# Patient Record
Sex: FEMALE | Race: WHITE | NOT HISPANIC OR LATINO | Employment: UNEMPLOYED | URBAN - METROPOLITAN AREA
[De-identification: names, ages, dates, MRNs, and addresses within clinical notes are randomized per-mention and may not be internally consistent; named-entity substitution may affect disease eponyms.]

---
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
## 2021-03-21 ENCOUNTER — OFFICE VISIT (OUTPATIENT)
Dept: URGENT CARE | Facility: URGENT CARE | Age: 21
End: 2021-03-21
Payer: COMMERCIAL

## 2021-03-21 VITALS
DIASTOLIC BLOOD PRESSURE: 69 MMHG | TEMPERATURE: 97.9 F | RESPIRATION RATE: 16 BRPM | HEART RATE: 98 BPM | OXYGEN SATURATION: 100 % | BODY MASS INDEX: 18.52 KG/M2 | WEIGHT: 106.2 LBS | SYSTOLIC BLOOD PRESSURE: 102 MMHG

## 2021-03-21 DIAGNOSIS — R30.0 DYSURIA: Primary | ICD-10-CM

## 2021-03-21 DIAGNOSIS — N30.00 ACUTE CYSTITIS WITHOUT HEMATURIA: ICD-10-CM

## 2021-03-21 DIAGNOSIS — R82.90 NONSPECIFIC FINDING ON EXAMINATION OF URINE: ICD-10-CM

## 2021-03-21 LAB
ALBUMIN UR-MCNC: 100 MG/DL
APPEARANCE UR: ABNORMAL
BACTERIA #/AREA URNS HPF: ABNORMAL /HPF
BILIRUB UR QL STRIP: NEGATIVE
COLOR UR AUTO: YELLOW
GLUCOSE UR STRIP-MCNC: NEGATIVE MG/DL
HGB UR QL STRIP: ABNORMAL
KETONES UR STRIP-MCNC: NEGATIVE MG/DL
LEUKOCYTE ESTERASE UR QL STRIP: ABNORMAL
NITRATE UR QL: NEGATIVE
NON-SQ EPI CELLS #/AREA URNS LPF: ABNORMAL /LPF
PH UR STRIP: 8 PH (ref 5–7)
RBC #/AREA URNS AUTO: ABNORMAL /HPF
SOURCE: ABNORMAL
SP GR UR STRIP: 1.02 (ref 1–1.03)
UROBILINOGEN UR STRIP-ACNC: 0.2 EU/DL (ref 0.2–1)
WBC #/AREA URNS AUTO: >100 /HPF

## 2021-03-21 PROCEDURE — 99213 OFFICE O/P EST LOW 20 MIN: CPT | Performed by: FAMILY MEDICINE

## 2021-03-21 PROCEDURE — 87086 URINE CULTURE/COLONY COUNT: CPT | Performed by: FAMILY MEDICINE

## 2021-03-21 PROCEDURE — 81001 URINALYSIS AUTO W/SCOPE: CPT | Performed by: FAMILY MEDICINE

## 2021-03-21 RX ORDER — SULFAMETHOXAZOLE/TRIMETHOPRIM 800-160 MG
1 TABLET ORAL 2 TIMES DAILY
Qty: 10 TABLET | Refills: 0 | Status: SHIPPED | OUTPATIENT
Start: 2021-03-21 | End: 2022-06-16

## 2021-03-21 NOTE — PATIENT INSTRUCTIONS

## 2021-03-21 NOTE — PROGRESS NOTES
ASSESSMENT/  PLAN:   Dysuria     - UA reflex to Microscopic and Culture  - Urine Microscopic    Nonspecific finding on examination of urine     - Urine Culture Aerobic Bacterial    Acute cystitis without hematuria     - sulfamethoxazole-trimethoprim (BACTRIM DS) 800-160 MG tablet; Take 1 tablet by mouth 2 times daily         Drink plenty of fluids.  Prevention and treatment of UTI's discussed.Signs and symptoms of pyelonephritis mentioned.  Follow up with primary care physician if not improving    We discussed that a urine culture is being performed and that if we find that if there is a bacteria in the urine that is resistant to the prescribed antibiotic he/she will receive call to change the antibiotic to better cover the infection.      -------------------------------------------------------------------------------------------    SUBJECTIVE:  Chief Complaint   Patient presents with     UTI     Painful urination, feeling of fullness, and cloudy urine.         Veronica Navarro is a 20 year old female who  presents today for a possible UTI. Symptoms of dysuria, urgency, frequency and burning have been going on for 1day(s).  Hematuria no.  sudden onset, still present and constantand moderate.  There is no history of fever, chills, nausea or vomiting.  No history of vaginal   discharge. This patient does   have a history of urinary tract infections. Patient denies long duration, rigors, flank pain and temperature > 101 degrees F. or vaginal discharge, vaginal odor and vaginal itching     No past medical history on file.  Patient Active Problem List   Diagnosis     Strep throat       ALLERGIES:  Miralax [polyethylene glycol]    IBUPROFEN PO, Take 200 mg by mouth  JUNEL FE 1.5/30 1.5-30 MG-MCG tablet, Take 1 tablet by mouth daily  XULANE 150-35 MCG/24HR patch,     No current facility-administered medications on file prior to visit.       Social History     Tobacco Use     Smoking status: Former Smoker     Types:  Other     Smokeless tobacco: Never Used   Substance Use Topics     Alcohol use: No        ROS:   CONSTITUTIONAL:NEGATIVE for fever, chills,    INTEGUMENTARY/SKIN: NEGATIVE for worrisome rashes, or lesions  EYES: NEGATIVE for vision changes or irritation  ENT/MOUTH: NEGATIVE for ear, mouth and throat problems  RESP:NEGATIVE for significant cough or SOB  GI: NEGATIVE for nausea, abdominal pain,   or change in bowel habits    OBJECTIVE:  /69 (BP Location: Left arm, Patient Position: Sitting, Cuff Size: Adult Regular)   Pulse 98   Temp 97.9  F (36.6  C) (Tympanic)   Resp 16   Wt 48.2 kg (106 lb 3.2 oz)   LMP 02/22/2021   SpO2 100%   Breastfeeding No   BMI 18.52 kg/m    GENERAL APPEARANCE: healthy, alert and no distress  RESP: lungs clear to auscultation - no rales, rhonchi or wheezes  CV: regular rates and rhythm, normal S1 S2, no murmur noted  ABDOMEN:  soft, nontender, no HSM or masses and bowel sounds normal  BACK: No CVA tenderness  SKIN: no suspicious lesions or rashes    Results for orders placed or performed in visit on 03/21/21   UA reflex to Microscopic and Culture     Status: Abnormal    Specimen: Midstream Urine   Result Value Ref Range    Color Urine Yellow     Appearance Urine Cloudy     Glucose Urine Negative NEG^Negative mg/dL    Bilirubin Urine Negative NEG^Negative    Ketones Urine Negative NEG^Negative mg/dL    Specific Gravity Urine 1.020 1.003 - 1.035    Blood Urine Trace (A) NEG^Negative    pH Urine 8.0 (H) 5.0 - 7.0 pH    Protein Albumin Urine 100 (A) NEG^Negative mg/dL    Urobilinogen Urine 0.2 0.2 - 1.0 EU/dL    Nitrite Urine Negative NEG^Negative    Leukocyte Esterase Urine Moderate (A) NEG^Negative    Source Midstream Urine    Urine Microscopic     Status: Abnormal   Result Value Ref Range    WBC Urine >100 (A) OTO5^0 - 5 /HPF    RBC Urine O - 2 OTO2^O - 2 /HPF    Squamous Epithelial /LPF Urine Few FEW^Few /LPF    Bacteria Urine Many (A) NEG^Negative /HPF

## 2021-03-23 LAB
BACTERIA SPEC CULT: NORMAL
Lab: NORMAL
SPECIMEN SOURCE: NORMAL

## 2021-07-25 ENCOUNTER — OFFICE VISIT (OUTPATIENT)
Dept: URGENT CARE | Facility: URGENT CARE | Age: 21
End: 2021-07-25
Payer: COMMERCIAL

## 2021-07-25 VITALS
SYSTOLIC BLOOD PRESSURE: 121 MMHG | WEIGHT: 110.8 LBS | OXYGEN SATURATION: 97 % | DIASTOLIC BLOOD PRESSURE: 77 MMHG | RESPIRATION RATE: 16 BRPM | TEMPERATURE: 97.4 F | HEART RATE: 68 BPM | BODY MASS INDEX: 19.32 KG/M2

## 2021-07-25 DIAGNOSIS — N30.00 ACUTE CYSTITIS WITHOUT HEMATURIA: Primary | ICD-10-CM

## 2021-07-25 DIAGNOSIS — R30.0 DYSURIA: ICD-10-CM

## 2021-07-25 LAB
ALBUMIN UR-MCNC: ABNORMAL MG/DL
APPEARANCE UR: ABNORMAL
BACTERIA #/AREA URNS HPF: ABNORMAL /HPF
BILIRUB UR QL STRIP: NEGATIVE
COLOR UR AUTO: YELLOW
GLUCOSE UR STRIP-MCNC: NEGATIVE MG/DL
HGB UR QL STRIP: ABNORMAL
KETONES UR STRIP-MCNC: NEGATIVE MG/DL
LEUKOCYTE ESTERASE UR QL STRIP: ABNORMAL
NITRATE UR QL: NEGATIVE
PH UR STRIP: 6 [PH] (ref 5–7)
RBC #/AREA URNS AUTO: ABNORMAL /HPF
SP GR UR STRIP: 1.01 (ref 1–1.03)
SQUAMOUS #/AREA URNS AUTO: ABNORMAL /LPF
UROBILINOGEN UR STRIP-ACNC: 0.2 E.U./DL
WBC #/AREA URNS AUTO: >100 /HPF

## 2021-07-25 PROCEDURE — 87086 URINE CULTURE/COLONY COUNT: CPT | Performed by: INTERNAL MEDICINE

## 2021-07-25 PROCEDURE — 99213 OFFICE O/P EST LOW 20 MIN: CPT | Performed by: INTERNAL MEDICINE

## 2021-07-25 PROCEDURE — 81001 URINALYSIS AUTO W/SCOPE: CPT | Performed by: INTERNAL MEDICINE

## 2021-07-25 PROCEDURE — 87186 SC STD MICRODIL/AGAR DIL: CPT | Performed by: INTERNAL MEDICINE

## 2021-07-25 PROCEDURE — 87088 URINE BACTERIA CULTURE: CPT | Performed by: INTERNAL MEDICINE

## 2021-07-25 RX ORDER — SULFAMETHOXAZOLE/TRIMETHOPRIM 800-160 MG
1 TABLET ORAL 2 TIMES DAILY
Qty: 6 TABLET | Refills: 0 | Status: SHIPPED | OUTPATIENT
Start: 2021-07-25 | End: 2021-07-28

## 2021-07-25 NOTE — PROGRESS NOTES
ASSESSMENT AND PLAN:      ICD-10-CM    1. Acute cystitis without hematuria  N30.00 sulfamethoxazole-trimethoprim (BACTRIM DS) 800-160 MG tablet   2. Dysuria  R30.0 UA Macro with Reflex to Micro and Culture - lab collect     UA Macro with Reflex to Micro and Culture - lab collect     Urine Microscopic Exam     Urine Culture     sulfamethoxazole-trimethoprim (BACTRIM DS) 800-160 MG tablet     Frequent UTI    PLAN:  UTI Adult:  NO Sulfa Allergy: Septra DS one tablet twice daily for 3 days    There are no Patient Instructions on file for this visit.  Return in about 1 week (around 8/1/2021), or if symptoms worsen or fail to improve.        Yamilka Bello MD  [facility]    Subjective     Veronica Navarro is a 20 year old who presents for Patient presents with:  UTI: Painful urination came back again this morning.     [facility]    UTI    Onset of symptoms was 1-2 week(s).  Course of illness is waxing and waning  Current and associated symptoms burning and foggy urine  frequency  Treatment and measures tried Uristat (Pyridium), Cranberry juice and Increase fluid intake  Predisposing factors include history of frequent UTI's  Patient denies flank pain, temperature > 101 degrees F. and vomiting            Review of Systems   Genitourinary: Negative for vaginal discharge and vaginal pain.           Objective    /77   Pulse 68   Temp 97.4  F (36.3  C) (Tympanic)   Resp 16   Wt 50.3 kg (110 lb 12.8 oz)   SpO2 97%   BMI 19.32 kg/m    Physical Exam  Vitals reviewed.   Constitutional:       Appearance: Normal appearance.   Abdominal:      Tenderness: There is no abdominal tenderness. There is no right CVA tenderness or left CVA tenderness.   Neurological:      Mental Status: She is alert.            Results for orders placed or performed in visit on 07/25/21 (from the past 24 hour(s))   UA Macro with Reflex to Micro and Culture - lab collect    Specimen: Urine,  Midstream   Result Value Ref Range    Color Urine Yellow Colorless, Straw, Light Yellow, Yellow    Appearance Urine Cloudy (A) Clear    Glucose Urine Negative Negative mg/dL    Bilirubin Urine Negative Negative    Ketones Urine Negative Negative mg/dL    Specific Gravity Urine 1.010 1.003 - 1.035    Blood Urine Large (A) Negative    pH Urine 6.0 5.0 - 7.0    Protein Albumin Urine Trace (A) Negative mg/dL    Urobilinogen Urine 0.2 0.2, 1.0 E.U./dL    Nitrite Urine Negative Negative    Leukocyte Esterase Urine Large (A) Negative   Urine Microscopic Exam   Result Value Ref Range    Bacteria Urine Moderate (A) None Seen /HPF    RBC Urine 5-10 (A) 0-2 /HPF /HPF    WBC Urine >100 (A) 0-5 /HPF /HPF    Squamous Epithelials Urine Few (A) None Seen /LPF

## 2021-07-27 LAB — BACTERIA UR CULT: ABNORMAL

## 2022-06-16 ENCOUNTER — OFFICE VISIT (OUTPATIENT)
Dept: URGENT CARE | Facility: URGENT CARE | Age: 22
End: 2022-06-16
Payer: OTHER MISCELLANEOUS

## 2022-06-16 VITALS
WEIGHT: 134.8 LBS | DIASTOLIC BLOOD PRESSURE: 77 MMHG | OXYGEN SATURATION: 96 % | HEART RATE: 69 BPM | TEMPERATURE: 98.4 F | BODY MASS INDEX: 23.5 KG/M2 | SYSTOLIC BLOOD PRESSURE: 120 MMHG

## 2022-06-16 DIAGNOSIS — S46.911A STRAIN OF RIGHT SHOULDER, INITIAL ENCOUNTER: Primary | ICD-10-CM

## 2022-06-16 DIAGNOSIS — S20.211A CONTUSION OF RIB ON RIGHT SIDE, INITIAL ENCOUNTER: ICD-10-CM

## 2022-06-16 DIAGNOSIS — M25.511 ACUTE PAIN OF RIGHT SHOULDER: ICD-10-CM

## 2022-06-16 PROCEDURE — 99213 OFFICE O/P EST LOW 20 MIN: CPT | Performed by: PHYSICIAN ASSISTANT

## 2022-06-16 ASSESSMENT — ENCOUNTER SYMPTOMS
WOUND: 0
NECK PAIN: 0
SHORTNESS OF BREATH: 0
DIARRHEA: 0
PALPITATIONS: 0
LIGHT-HEADEDNESS: 0
NAUSEA: 0
JOINT SWELLING: 0
HEADACHES: 0
RESPIRATORY NEGATIVE: 1
RHINORRHEA: 0
NECK STIFFNESS: 0
BACK PAIN: 0
WEAKNESS: 0
VOMITING: 0
MYALGIAS: 1
COUGH: 0
CHILLS: 0
ENDOCRINE NEGATIVE: 1
FEVER: 0
ARTHRALGIAS: 1
DIZZINESS: 0
CARDIOVASCULAR NEGATIVE: 1
EYES NEGATIVE: 1
SORE THROAT: 0
ALLERGIC/IMMUNOLOGIC NEGATIVE: 1

## 2022-06-16 NOTE — LETTER
[facility] [address] [address]          June 16, 2022    RE:  Veronica Navarro                                                                                                                                                       [address] [id number]            To whom it may concern:    Veronica Navarro is under my professional care for    Strain of right shoulder, initial encounter  Contusion of rib on right side, initial encounter  Acute pain of right shoulder She will be unable to  Work until cleared by her primary provider.    Sincerely,        Nixon Arzola PA-C

## 2022-06-16 NOTE — PROGRESS NOTES
Chief Complaint:     Chief Complaint   Patient presents with     WC     R shoulder pain. Ran into a door really hard with something behind it. R shoulder and R rib hurts. Onset: an hour ago        ASSESSMENT     1. Strain of right shoulder, initial encounter    2. Contusion of rib on right side, initial encounter    3. Acute pain of right shoulder         PLAN    Patient is in no acute distress.  Vital signs are stable.  RICE discussed.  Recommended rest and avoidance of activities which cause pain or swelling.  Pain relief: Acetaminophen and or Ibuprofen with food.  Patient verbalized understanding, and agrees with this plan.       HPI: Veronica Navarro is an 21 year old female who presents today for evaluation of R shoulder and rib pain.  Onset of the pain was 1 hour ago after she was struck by a door.  She complains of R shoulder pain that is worse with movement.  She has not tried anything for the pain.  No Hx of injury to the shoulder.      Patient denies any numbness, tingling, weakness or dysfunction of the R shoulder.    She has some R sided rib pain that is worse with movement.    She denies any chest pain, SOB, or hemoptysis.        ROS:      Review of Systems   Constitutional: Negative for chills and fever.   HENT: Negative for congestion, ear pain, rhinorrhea and sore throat.    Eyes: Negative.    Respiratory: Negative.  Negative for cough and shortness of breath.    Cardiovascular: Negative.  Negative for chest pain and palpitations.   Gastrointestinal: Negative for diarrhea, nausea and vomiting.   Endocrine: Negative.    Genitourinary: Negative.    Musculoskeletal: Positive for arthralgias and myalgias. Negative for back pain, joint swelling, neck pain and neck stiffness.   Skin: Negative.  Negative for rash and wound.   Allergic/Immunologic: Negative.  Negative for immunocompromised state.   Neurological: Negative for dizziness, weakness, light-headedness and headaches.        Problem  history  Patient Active Problem List   Diagnosis     Strep throat        Allergies  Allergies   Allergen Reactions     Miralax [Polyethylene Glycol] GI Disturbance        Smoking History  History   Smoking Status     Former Smoker     Types: Other   Smokeless Tobacco     Never Used        Current Meds  No current outpatient medications on file.        Vital signs reviewed by Nixon Arzola PA-C  /77 (BP Location: Left arm, Patient Position: Sitting, Cuff Size: Adult Regular)   Pulse 69   Temp 98.4  F (36.9  C) (Tympanic)   Wt 61.1 kg (134 lb 12.8 oz)   SpO2 96%   BMI 23.50 kg/m      Physical Exam     Physical Exam  Vitals and nursing note reviewed.   Constitutional:       General: She is not in acute distress.     Appearance: She is well-developed. She is not ill-appearing, toxic-appearing or diaphoretic.   HENT:      Head: Normocephalic and atraumatic.      Right Ear: Tympanic membrane and external ear normal. No drainage, swelling or tenderness. Tympanic membrane is not perforated, erythematous, retracted or bulging.      Left Ear: Tympanic membrane and external ear normal. No drainage, swelling or tenderness. Tympanic membrane is not perforated, erythematous, retracted or bulging.      Nose: No mucosal edema, congestion or rhinorrhea.      Right Sinus: No maxillary sinus tenderness or frontal sinus tenderness.      Left Sinus: No maxillary sinus tenderness or frontal sinus tenderness.      Mouth/Throat:      Pharynx: No pharyngeal swelling, oropharyngeal exudate, posterior oropharyngeal erythema or uvula swelling.      Tonsils: No tonsillar abscesses.   Eyes:      Pupils: Pupils are equal, round, and reactive to light.   Neck:      Trachea: Trachea normal.   Cardiovascular:      Rate and Rhythm: Normal rate and regular rhythm.      Heart sounds: Normal heart sounds, S1 normal and S2 normal. No murmur heard.    No friction rub. No gallop.   Pulmonary:      Effort: Pulmonary effort is normal. No  respiratory distress.      Breath sounds: Normal breath sounds. No decreased breath sounds, wheezing, rhonchi or rales.   Chest:      Chest wall: Tenderness present. No deformity, swelling, crepitus or edema. There is no dullness to percussion.       Abdominal:      General: Bowel sounds are normal. There is no distension.      Palpations: Abdomen is soft. Abdomen is not rigid. There is no mass.      Tenderness: There is no abdominal tenderness. There is no guarding or rebound.   Musculoskeletal:      Right shoulder: No swelling, deformity, effusion, tenderness, bony tenderness or crepitus. Normal range of motion. Normal strength. Normal pulse.      Cervical back: Full passive range of motion without pain, normal range of motion and neck supple.   Lymphadenopathy:      Cervical: No cervical adenopathy.   Skin:     General: Skin is warm and dry.   Neurological:      Mental Status: She is alert and oriented to person, place, and time.      Cranial Nerves: No cranial nerve deficit.      Deep Tendon Reflexes: Reflexes are normal and symmetric.   Psychiatric:         Behavior: Behavior normal. Behavior is cooperative.         Thought Content: Thought content normal.         Judgment: Judgment normal.             Nixon Arzola PA-C  6/16/2022, 2:08 PM

## 2022-06-22 ENCOUNTER — VIRTUAL VISIT (OUTPATIENT)
Dept: FAMILY MEDICINE | Facility: CLINIC | Age: 22
End: 2022-06-22
Payer: COMMERCIAL

## 2022-06-22 DIAGNOSIS — S20.211D CONTUSION OF RIB ON RIGHT SIDE, SUBSEQUENT ENCOUNTER: Primary | ICD-10-CM

## 2022-06-22 DIAGNOSIS — S40.011D CONTUSION OF RIGHT SHOULDER, SUBSEQUENT ENCOUNTER: ICD-10-CM

## 2022-06-22 PROCEDURE — 99213 OFFICE O/P EST LOW 20 MIN: CPT | Mod: 95 | Performed by: PHYSICIAN ASSISTANT

## 2022-06-22 NOTE — LETTER
REPORT OF WORK COMP    [address] [id number] [phone]      PATIENT DATA    Employee Name: Veronica Navarro      : 2000     #: [id number]    Work related injury: Yes  Employer at time of injury: [employer]   Employer contact & phone:   Employed elsewhere? No  Workers' Compensation Carrier/Managed Care Plan:  TBD     Today's date: 2022  Date of injury: 22  Date of first visit: 22 at Urgent care     PROVIDER EVALUATION: Please fill in as needed.  Please give copy to employee for employer.    1. Diagnosis:  Shoulder contusion, Rib contusion     2. Treatment: rest, Ibuprofen .  3. Medication: Ibuprofen   NOTE: When ordering a medication, [place] Rules require Work Comp or WC on prescriptions.    4. No work from 22 to 22.  5. Return to work date: 22   ** WITH RESTRICTIONS? No restrictions      RESTRICTIONS: Unlimited unless listed.  Restrictions apply to home and leisure also.  If work restrictions is not available, the employee is totally disabled.    Maximum Medical Improvement (Date): 22  Any Permanent Partial Disability? 0%    Provider comments: return to work without limitations     Medical Examiner: Maranda Moss PA-C           License or registration: [id number]    Next appointment: As needed    CC: Employer, Managed Care Plan/Payor, Patient

## 2022-06-22 NOTE — PROGRESS NOTES
Veronica is a 21 year old who is being evaluated via a billable telephone visit.      What phone number would you like to be contacted at? [phone]  How would you like to obtain your AVS? Mail a copy    Assessment & Plan   Problem List Items Addressed This Visit    None     Visit Diagnoses     Contusion of rib on right side, subsequent encounter    -  Primary    Contusion of right shoulder, subsequent encounter             All pain has resolved. Patient reports that she is ready to go back to full duty.  Workability letter was created.   Patient may return to full duty without limitations today     15 minutes spent on the date of the encounter doing chart review, history and exam, documentation and further activities per the note        No follow-ups on file.    Maranda Moss PA-C  [facility]    Cristine Martin is a 21 year old, presenting for the following health issues:  Patient Request for Note/Letter (Return to work/)      HPI     Pain History:  When did you first notice your pain? - Date of injury 6/16/22. Patient ran into a rotating door that was blocked by a box on the other side at work. Patient injured her right shoulder and right side  Of the ribs   Have you seen any provider previously for this issue? Yes -   How has your pain affected your ability to work? Patient was off work . 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 Musculoskeletal problem/pain  Onset/Duration: 2/16/22  /Description  Location: Shoulder - right  Joint Swelling: no  Redness: no  Pain: YES- soreness- resolved   Warmth: no  Intensity:  mild  Progression of Symptoms:  improving  Accompanying signs and symptoms:    Fevers: no  Numbness/tingling/weakness: no  History  Trauma to the area: YES- At work  Recent illness:  no  Previous similar problem: YES  Previous evaluation:   YES  Precipitating or alleviating factors:  Aggravating factors include: lifting and overuse  Therapies tried and outcome: Ibuprofen    Patient reports that her has resolved and she wants to go back to work to full duty       Review of Systems   Constitutional, HEENT, cardiovascular, pulmonary, gi and gu systems are negative, except as otherwise noted.      Objective           Vitals:  No vitals were obtained today due to virtual visit.    Physical Exam   healthy, alert and no distress  PSYCH: Alert and oriented times 3; coherent speech, normal   rate and volume, able to articulate logical thoughts, able   to abstract reason, no tangential thoughts, no hallucinations   or delusions  Her affect is normal  RESP: No cough, no audible wheezing, able to talk in full sentences  Remainder of exam unable to be completed due to telephone visits            Phone call duration: 15 minutes    .  ..

## 2022-06-27 ENCOUNTER — VIRTUAL VISIT (OUTPATIENT)
Dept: FAMILY MEDICINE | Facility: CLINIC | Age: 22
End: 2022-06-27
Payer: COMMERCIAL

## 2022-06-27 DIAGNOSIS — R39.9 UTI SYMPTOMS: Primary | ICD-10-CM

## 2022-06-27 PROCEDURE — 99213 OFFICE O/P EST LOW 20 MIN: CPT | Mod: TEL | Performed by: NURSE PRACTITIONER

## 2022-06-27 RX ORDER — NITROFURANTOIN 25; 75 MG/1; MG/1
100 CAPSULE ORAL 2 TIMES DAILY
Qty: 10 CAPSULE | Refills: 0 | Status: SHIPPED | OUTPATIENT
Start: 2022-06-27 | End: 2022-07-02

## 2022-06-27 NOTE — PATIENT INSTRUCTIONS
PLAN:   1.   Symptomatic therapy suggested: increase fluids and call prn if symptoms persist or worsen.  2.  Orders Placed This Encounter   Medications    nitroFURantoin macrocrystal-monohydrate (MACROBID) 100 MG capsule     Sig: Take 1 capsule (100 mg) by mouth 2 times daily for 5 days     Dispense:  10 capsule     Refill:  0     Orders Placed This Encounter   Procedures    UA with Microscopic reflex to Culture       3. Patient needs to follow up in if no improvement,or sooner if worsening of symptoms or other symptoms develop.  Will follow up and/or notify patient on results via phone or mail to determine further need for followup

## 2022-06-27 NOTE — PROGRESS NOTES
Veronica is a 21 year old who is being evaluated via a billable telephone visit.      What phone number would you like to be contacted at? [phone]    How would you like to obtain your AVS? Mail a copy        Subjective   Veronica is a 21 year old, presenting for the following health issues:  No chief complaint on file.      HPI     Genitourinary - Female  Onset/Duration: symptoms started   Description:   Painful urination (Dysuria): YES           Frequency: YES  Blood in urine (Hematuria): no  Delay in urine (Hesitency): no  Intensity: moderate  Progression of Symptoms:  waxing and waning  Accompanying Signs & Symptoms:  Fever/chills: no  Flank pain: no  Nausea and vomiting: no  Vaginal symptoms: none  Abdominal/Pelvic Pain: no  History:   History of frequent UTI s: YES- last a year ago   History of kidney stones: no  Sexually Active: YES- but is not using regular contraception. Not using condoms   Possibility of pregnancy: Yes  Precipitating or alleviating factors: None  Therapies tried and outcome: Pyridium  and Increase fluid intake and just finished her menses     Lab work is in process  Labs reviewed in EPIC  BP Readings from Last 3 Encounters:   06/16/22 120/77   07/25/21 121/77   03/21/21 102/69    Wt Readings from Last 3 Encounters:   06/16/22 61.1 kg (134 lb 12.8 oz)   07/25/21 50.3 kg (110 lb 12.8 oz) (16 %, Z= -0.98)*   03/21/21 48.2 kg (106 lb 3.2 oz) (10 %, Z= -1.29)*     * Growth percentiles are based on CDC (Girls, 2-20 Years) data.                  Patient Active Problem List   Diagnosis     Strep throat     No past surgical history on file.    Social History     Tobacco Use     Smoking status: Former Smoker     Types: Other     Smokeless tobacco: Never Used   Substance Use Topics     Alcohol use: No     No family history on file.      No current outpatient medications on file.     Allergies   Allergen Reactions     Miralax [Polyethylene Glycol] GI Disturbance         '  Review of Systems    Constitutional, HEENT, cardiovascular, pulmonary, gi and gu systems are negative, except as otherwise noted.      Objective           Vitals:  No vitals were obtained today due to virtual visit.    Physical Exam   alert, active and no distress  PSYCH: Alert and oriented times 3; coherent speech, normal   rate and volume, able to articulate logical thoughts, able   to abstract reason, no tangential thoughts, no hallucinations   or delusions  Her affect is normal and pleasant  RESP: No cough, no audible wheezing, able to talk in full sentences  Remainder of exam unable to be completed due to telephone visits    Recent Results (from the past 168 hour(s))   UA with Microscopic reflex to Culture    Collection Time: 06/28/22  4:12 PM    Specimen: Urine, Midstream   Result Value Ref Range    Color Urine Yellow Colorless, Straw, Light Yellow, Yellow    Appearance Urine Clear Clear    Glucose Urine Negative Negative mg/dL    Bilirubin Urine Negative Negative    Ketones Urine Negative Negative mg/dL    Specific Gravity Urine <=1.005 1.003 - 1.035    Blood Urine Negative Negative    pH Urine 5.5 5.0 - 7.0    Protein Albumin Urine Negative Negative mg/dL    Urobilinogen Urine 0.2 0.2, 1.0 E.U./dL    Nitrite Urine Negative Negative    Leukocyte Esterase Urine Negative Negative   Urine Microscopic    Collection Time: 06/28/22  4:12 PM   Result Value Ref Range    Bacteria Urine Few (A) None Seen /HPF    RBC Urine None Seen 0-2 /HPF /HPF    WBC Urine 0-5 0-5 /HPF /HPF    Squamous Epithelials Urine Few (A) None Seen /LPF   Urine Culture    Collection Time: 06/28/22  4:12 PM    Specimen: Urine, Midstream   Result Value Ref Range    Culture <10,000 CFU/mL Urogenital yefri        Assessment & Plan     UTI symptoms  Patient was instructed to drink plenty of fluids, urinate frequently and to contact the office promptly should she notice fever greater than 102, increase in discomfort, skin rash, lack of improvement after 2 days of  treatment, or the appearance of new symptoms.    - UA with Microscopic reflex to Culture  Will follow up and/or notify patient on results via phone or mail to determine further need for followup      Will Start:  - nitroFURantoin macrocrystal-monohydrate (MACROBID) 100 MG capsule  Dispense: 10 capsule; Refill: 0      Ordering of each unique test  Prescription drug management   Time spent doing chart review, history and exam, documentation and further activities per the note       See Patient Instructions  Patient Instructions     PLAN:   1.   Symptomatic therapy suggested: increase fluids and call prn if symptoms persist or worsen.  2.  Orders Placed This Encounter   Medications     nitroFURantoin macrocrystal-monohydrate (MACROBID) 100 MG capsule     Sig: Take 1 capsule (100 mg) by mouth 2 times daily for 5 days     Dispense:  10 capsule     Refill:  0     Orders Placed This Encounter   Procedures     UA with Microscopic reflex to Culture       3. Patient needs to follow up in if no improvement,or sooner if worsening of symptoms or other symptoms develop.  Will follow up and/or notify patient on results via phone or mail to determine further need for followup        Return in about 1 week (around 7/4/2022), or if symptoms worsen or fail to improve.    BIB [facility]          Phone call duration: 16 minutes    .  ..

## 2022-06-28 ENCOUNTER — LAB (OUTPATIENT)
Dept: LAB | Facility: CLINIC | Age: 22
End: 2022-06-28
Payer: COMMERCIAL

## 2022-06-28 DIAGNOSIS — R39.9 UTI SYMPTOMS: ICD-10-CM

## 2022-06-28 LAB
ALBUMIN UR-MCNC: NEGATIVE MG/DL
APPEARANCE UR: CLEAR
BACTERIA #/AREA URNS HPF: ABNORMAL /HPF
BILIRUB UR QL STRIP: NEGATIVE
COLOR UR AUTO: YELLOW
GLUCOSE UR STRIP-MCNC: NEGATIVE MG/DL
HGB UR QL STRIP: NEGATIVE
KETONES UR STRIP-MCNC: NEGATIVE MG/DL
LEUKOCYTE ESTERASE UR QL STRIP: NEGATIVE
NITRATE UR QL: NEGATIVE
PH UR STRIP: 5.5 [PH] (ref 5–7)
RBC #/AREA URNS AUTO: ABNORMAL /HPF
SP GR UR STRIP: <=1.005 (ref 1–1.03)
SQUAMOUS #/AREA URNS AUTO: ABNORMAL /LPF
UROBILINOGEN UR STRIP-ACNC: 0.2 E.U./DL
WBC #/AREA URNS AUTO: ABNORMAL /HPF

## 2022-06-28 PROCEDURE — 87086 URINE CULTURE/COLONY COUNT: CPT

## 2022-06-28 PROCEDURE — 81001 URINALYSIS AUTO W/SCOPE: CPT

## 2022-06-29 DIAGNOSIS — R39.9 UTI SYMPTOMS: Primary | ICD-10-CM

## 2022-07-01 ENCOUNTER — TELEPHONE (OUTPATIENT)
Dept: FAMILY MEDICINE | Facility: CLINIC | Age: 22
End: 2022-07-01

## 2022-07-01 LAB — BACTERIA UR CULT: NORMAL

## 2022-07-01 NOTE — RESULT ENCOUNTER NOTE
Please call  Veronica Navarro,    Attached are your test results.  -Urine culture is shows normal bacteria from genital area.   There is no need for antibiotics at this point.  If new, worsening, or persistent symptoms occur then you should call or return for a recheck.       Please contact us if you have any questions.    Radha Marinelli, CNP

## 2022-10-26 ENCOUNTER — LAB REQUISITION (OUTPATIENT)
Dept: LAB | Facility: CLINIC | Age: 22
End: 2022-10-26

## 2022-10-26 DIAGNOSIS — N93.9 ABNORMAL UTERINE AND VAGINAL BLEEDING, UNSPECIFIED: ICD-10-CM

## 2022-10-26 LAB
ERYTHROCYTE [DISTWIDTH] IN BLOOD BY AUTOMATED COUNT: 13 % (ref 10–15)
HCG INTACT+B SERPL-ACNC: <1 MIU/ML
HCT VFR BLD AUTO: 46.3 % (ref 35–47)
HGB BLD-MCNC: 15.6 G/DL (ref 11.7–15.7)
MCH RBC QN AUTO: 29.3 PG (ref 26.5–33)
MCHC RBC AUTO-ENTMCNC: 33.7 G/DL (ref 31.5–36.5)
MCV RBC AUTO: 87 FL (ref 78–100)
PLATELET # BLD AUTO: 351 10E3/UL (ref 150–450)
RBC # BLD AUTO: 5.32 10E6/UL (ref 3.8–5.2)
T4 FREE SERPL-MCNC: 1.19 NG/DL (ref 0.9–1.7)
TSH SERPL DL<=0.005 MIU/L-ACNC: 7.45 UIU/ML (ref 0.3–4.2)
WBC # BLD AUTO: 6.8 10E3/UL (ref 4–11)

## 2022-10-26 PROCEDURE — 84439 ASSAY OF FREE THYROXINE: CPT | Performed by: NURSE PRACTITIONER

## 2022-10-26 PROCEDURE — 85027 COMPLETE CBC AUTOMATED: CPT | Performed by: NURSE PRACTITIONER

## 2022-10-26 PROCEDURE — 84443 ASSAY THYROID STIM HORMONE: CPT | Performed by: NURSE PRACTITIONER

## 2022-10-26 PROCEDURE — 84702 CHORIONIC GONADOTROPIN TEST: CPT | Performed by: NURSE PRACTITIONER

## 2022-10-28 ENCOUNTER — VIRTUAL VISIT (OUTPATIENT)
Dept: FAMILY MEDICINE | Facility: CLINIC | Age: 22
End: 2022-10-28
Payer: COMMERCIAL

## 2022-10-28 DIAGNOSIS — E03.9 HYPOTHYROIDISM, UNSPECIFIED TYPE: Primary | ICD-10-CM

## 2022-10-28 PROCEDURE — 99214 OFFICE O/P EST MOD 30 MIN: CPT | Mod: 95 | Performed by: FAMILY MEDICINE

## 2022-10-28 RX ORDER — LEVOTHYROXINE SODIUM 25 UG/1
12.5 TABLET ORAL DAILY
Qty: 45 TABLET | Refills: 3 | Status: SHIPPED | OUTPATIENT
Start: 2022-10-28 | End: 2023-11-01

## 2022-10-28 NOTE — PATIENT INSTRUCTIONS
At [facility], we strive to deliver an exceptional experience to you, every time we see you. If you receive a survey, please complete it as we do value your feedback.  If you have MyChart, you can expect to receive results automatically within 24 hours of their completion.  Your provider will send a note interpreting your results as well.   If you do not have MyChart, you should receive your results in about a week by mail.    Your care team:                            Family Medicine Internal Medicine   MD Titus Akins MD Shantel Branch-Fleming, MD Srinivasa Vaka, MD Katya Belousova, PABIB Rasmussen CNP, MD ([facility]   Lalit Ocasio, MD Gabrielle Jones MD Amelia Massimini APRN SIS Sim APRN MD Kassandra Finch MD          Clinic hours: Monday - Thursday 7 am-6 pm; Fridays 7 am-5 pm.   Urgent care: Monday - Friday 10 am- 8 pm; Saturday and Sunday 9 am-5 pm.    Clinic: [phone]       [facility]: Monday - Thursday 8 am - 7 pm; Friday 8 am - 6 pm  [facility]: [phone]

## 2022-10-28 NOTE — PROGRESS NOTES
Veronica is a 22 year old who is being evaluated via a billable telephone visit.      What phone number would you like to be contacted at? [phone]  How would you like to obtain your AVS? Andreimk Martin is a 22 year old presenting for the following health issues:  No chief complaint on file.      History of Present Illness       Hypothyroidism:     Since last visit, patient describes the following symptoms::  Anxiety, Depression, Fatigue and Tremors    She eats 0-1 servings of fruits and vegetables daily.She consumes 0 sweetened beverage(s) daily.She exercises with enough effort to increase her heart rate 10 to 19 minutes per day.  She exercises with enough effort to increase her heart rate 4 days per week.   She is taking medications regularly.       Patient stated she is feeling a little anxious, depressed, fatigue and some tremors for the last few months. Recent TSH level was elevated with free T4 normal. Patient is following up.    Review of Systems   Constitutional, HEENT, cardiovascular, pulmonary, GI, , musculoskeletal, neuro, skin, endocrine and psych systems are negative, except as otherwise noted.      Objective           Vitals:  No vitals were obtained today due to virtual visit.    Physical Exam   healthy, alert and no distress  PSYCH: Alert and oriented times 3; coherent speech, normal   rate and volume, able to articulate logical thoughts, able   to abstract reason, no tangential thoughts, no hallucinations   or delusions  Her affect is normal  RESP: No cough, no audible wheezing, able to talk in full sentences  Remainder of exam unable to be completed due to telephone visits    A/P:  (E03.9) Hypothyroidism, unspecified type  (primary encounter diagnosis)  Comment:   Plan: levothyroxine (SYNTHROID/LEVOTHROID) 25 MCG         tablet, TSH with free T4 reflex        Start low dose levothyroxine 12.5 mcg daily. Recheck tft's in 6 weeks and adjust as needed. If symptoms do not improve,  patient will follow up. Discussed recommendation for physical for health maintenance update.    Rosalio Wei MD          Phone call duration: 9 minutes

## 2022-11-03 ENCOUNTER — LAB REQUISITION (OUTPATIENT)
Dept: LAB | Facility: CLINIC | Age: 22
End: 2022-11-03

## 2022-11-03 DIAGNOSIS — Z12.4 ENCOUNTER FOR SCREENING FOR MALIGNANT NEOPLASM OF CERVIX: ICD-10-CM

## 2022-11-03 PROCEDURE — G0145 SCR C/V CYTO,THINLAYER,RESCR: HCPCS | Performed by: NURSE PRACTITIONER

## 2022-11-08 LAB
BKR LAB AP GYN ADEQUACY: NORMAL
BKR LAB AP GYN INTERPRETATION: NORMAL
BKR LAB AP HPV REFLEX: NORMAL
BKR LAB AP LMP: NORMAL
BKR LAB AP PREVIOUS ABNL DX: NORMAL
BKR LAB AP PREVIOUS ABNORMAL: NORMAL
PATH REPORT.COMMENTS IMP SPEC: NORMAL
PATH REPORT.COMMENTS IMP SPEC: NORMAL
PATH REPORT.RELEVANT HX SPEC: NORMAL

## 2022-12-20 ENCOUNTER — LAB (OUTPATIENT)
Dept: LAB | Facility: CLINIC | Age: 22
End: 2022-12-20
Payer: COMMERCIAL

## 2022-12-20 DIAGNOSIS — E03.9 HYPOTHYROIDISM, UNSPECIFIED TYPE: ICD-10-CM

## 2022-12-20 PROCEDURE — 84443 ASSAY THYROID STIM HORMONE: CPT

## 2022-12-20 PROCEDURE — 36415 COLL VENOUS BLD VENIPUNCTURE: CPT

## 2022-12-21 LAB — TSH SERPL DL<=0.005 MIU/L-ACNC: 2.7 MU/L (ref 0.4–4)

## 2023-04-23 ENCOUNTER — HEALTH MAINTENANCE LETTER (OUTPATIENT)
Age: 23
End: 2023-04-23

## 2023-05-09 ENCOUNTER — OFFICE VISIT (OUTPATIENT)
Dept: URGENT CARE | Facility: URGENT CARE | Age: 23
End: 2023-05-09
Payer: COMMERCIAL

## 2023-05-09 VITALS
BODY MASS INDEX: 24.27 KG/M2 | SYSTOLIC BLOOD PRESSURE: 121 MMHG | HEIGHT: 63 IN | DIASTOLIC BLOOD PRESSURE: 83 MMHG | HEART RATE: 80 BPM | OXYGEN SATURATION: 96 % | RESPIRATION RATE: 16 BRPM | TEMPERATURE: 98.5 F | WEIGHT: 137 LBS

## 2023-05-09 DIAGNOSIS — R07.0 THROAT PAIN: Primary | ICD-10-CM

## 2023-05-09 LAB
DEPRECATED S PYO AG THROAT QL EIA: NEGATIVE
GROUP A STREP BY PCR: NOT DETECTED

## 2023-05-09 PROCEDURE — 99213 OFFICE O/P EST LOW 20 MIN: CPT

## 2023-05-09 PROCEDURE — 87651 STREP A DNA AMP PROBE: CPT

## 2023-05-09 ASSESSMENT — PAIN SCALES - GENERAL: PAINLEVEL: SEVERE PAIN (7)

## 2023-05-09 NOTE — PATIENT INSTRUCTIONS
Strep test is negative for strep throat.  Get plenty of rest and drink fluids.  Can use Tylenol and/or ibuprofen as needed for pain.  Maximum dose of Tylenol is 4000mg in a 24 hour period of time.  Take ibuprofen with food to avoid stomach upset.  You can also try warm salt water gargles, hot/warm water or tea with honey and/or lemon and/or Cepacol lozenges or spray for your sore throat.

## 2023-05-09 NOTE — PROGRESS NOTES
"ASSESSMENT:   (R07.0) Throat pain  (primary encounter diagnosis)  Plan: Streptococcus A Rapid Screen w/Reflex to PCR -         Clinic Collect    PLAN:  Informed the patient that the strep test is positive for strep throat.  We discussed the need to get plenty of rest, drink fluids and use Tylenol and or ibuprofen as needed for pain with a maximum dose of Tylenol being 4000 mg in a 24-hour period of time and to take ibuprofen with food to avoid upset stomach.  We also discussed trying warm salt water gargles, hot/warm water or tea with honey and/or lemon and/or Cepacol lozenges or spray for the sore throat.  Discussed the need to return to clinic with any new or worsening symptoms.  Patient acknowledged their understanding of the above plan.    The use of Dragon/RunnerPlaceation services may have been used to construct the content in this note; any grammatical or spelling errors are non-intentional. Please contact the author of this note directly if you are in need of any clarification.      Lul Simental, BIB CNP    SUBJECTIVE:   Veronica Navarro  is a 22 year old female who is here today because of: Sore Throat.  The patient has had symptoms of fatigue.   Onset of symptoms was 1 day ago. Course of illness is worsening.  Patient admits to exposure to illness at work.   Patient denies fever, vomiting and diarrhea  Treatment measures tried include none.    ROS:  Negative except noted above.      OBJECTIVE:   /83 (BP Location: Right arm, Patient Position: Sitting, Cuff Size: Adult Regular)   Pulse 80   Temp 98.5  F (36.9  C) (Tympanic)   Resp 16   Ht 1.6 m (5' 3\")   Wt 62.1 kg (137 lb)   LMP 05/01/2023 (Approximate)   SpO2 96%   BMI 24.27 kg/m    General: healthy, alert and no distress  Eyes - conjunctivae clear.  Nose/Sinuses - Nares normal.Mucosa normal. No drainage or sinus tenderness.  Oropharynx - Lips, mucosa, and tongue normal. Positive findings: oropharyngeal erythema, tonsillar " hypertrophy  Neck - Neck supple; no lymphadenopathy  Lungs - Lungs clear; no wheezing or rales.  Heart - regular rate and rhythm. No murmurs, rub.    Labs:  Rapid Strep test is negative; await throat culture results.

## 2023-05-10 ENCOUNTER — TELEPHONE (OUTPATIENT)
Dept: URGENT CARE | Facility: URGENT CARE | Age: 23
End: 2023-05-10
Payer: COMMERCIAL

## 2023-05-10 NOTE — TELEPHONE ENCOUNTER
Pt calling to get strep results that were done yesterday In [facility].    Relayed to pt negative results.    No other questions at this time.      Marnie Lowe RN  [facility]

## 2023-06-22 NOTE — TELEPHONE ENCOUNTER
[facility]  [address]  [facility] [address]  Phone: [phone]  Fax: [phone]    Rene Robles  [address]  [facility] [address]           June 22, 2023    Dear Rene Robles:    Since you are no longer attending services at the [facility], I would like to share our “Wellness Instructions\" to remind you of strategies you can use when dealing with difficult challenges. For continuing support, we recommend that you utilize community and online resources that may be available to you.     If you should need our services in the future, please call [phone]  to schedule an appointment. We can also provide information about other community resources that may be available.       Thank you for choosing [facility]. I have appreciated working with you and wish you well.    Sincerely,    Mary Lou Luong                                           Wellness Instructions    To facilitate your health and wellness, we recommend you:    Practice your relaxation skills  Practice your mindfulness skills  Practice your problem solving skills  Continue to reach out to your social supports  Continue to regularly engage in social activities  Practice your behavior management skills with your child  Practice staying healthy with exercise, healthy nutrition, restful sleep and maintain a healthy daily routine.    In the future, if you are in crisis and need immediate help because you are thinking about harming yourself or attempting suicide:    Call our crisis workers at [phone] (Voice) to help you decide what you should do.  Tell someone who can help you make these calls or take you to the hospital.  Call 9-1-1 for emergency services.  Go to the nearest hospital emergency room.  Call the toll-free 24 hour hotline of the National Suicide Prevention Lifeline at 375 to be connected to a trained counselor at a suicide crisis center  Called patient and informed her of provider's message. She had no further questions.  Sherron Al RN  [facility]    ----- Message -----  From: Radha Marinelli APRN CNP  Sent: 7/1/2022   6:57 AM CDT  To: [facility]    Please call  Veronica Navarro,    Attached are your test results.  -Urine culture is shows normal bacteria from genital area.   There is no need for antibiotics at this point.  If new, worsening, or persistent symptoms occur then you should call or return for a recheck.        Please contact us if you have any questions.    Radha Marinelli CNP           nearest you.    In the future, if you are having a difficult time managing your feelings but are NOT thinking about harming yourself or attempting suicide:    Contact the [place] Warm Line at [phone] from 8am-8pm on Monday-Saturday.  Use the Crisis Text Line by texting HOME to 848145 to be connected with a volunteer crisis counselor.  Contact your doctor's office.  Contact our clinic at [phone]  for the  to begin services again or you can contact another agency.

## 2023-08-17 ENCOUNTER — OFFICE VISIT (OUTPATIENT)
Dept: URGENT CARE | Facility: URGENT CARE | Age: 23
End: 2023-08-17
Payer: COMMERCIAL

## 2023-08-17 VITALS
SYSTOLIC BLOOD PRESSURE: 114 MMHG | HEART RATE: 99 BPM | DIASTOLIC BLOOD PRESSURE: 77 MMHG | TEMPERATURE: 100 F | BODY MASS INDEX: 24.29 KG/M2 | RESPIRATION RATE: 20 BRPM | OXYGEN SATURATION: 99 % | WEIGHT: 137.1 LBS

## 2023-08-17 DIAGNOSIS — B96.89 ACUTE BACTERIAL TONSILLITIS: Primary | ICD-10-CM

## 2023-08-17 DIAGNOSIS — J03.80 ACUTE BACTERIAL TONSILLITIS: Primary | ICD-10-CM

## 2023-08-17 LAB
DEPRECATED S PYO AG THROAT QL EIA: NEGATIVE
GROUP A STREP BY PCR: NOT DETECTED

## 2023-08-17 PROCEDURE — 87651 STREP A DNA AMP PROBE: CPT | Performed by: PHYSICIAN ASSISTANT

## 2023-08-17 PROCEDURE — 99213 OFFICE O/P EST LOW 20 MIN: CPT | Performed by: PHYSICIAN ASSISTANT

## 2023-08-17 RX ORDER — LIDOCAINE HYDROCHLORIDE 20 MG/ML
15 SOLUTION OROPHARYNGEAL
Qty: 100 ML | Refills: 0 | Status: SHIPPED | OUTPATIENT
Start: 2023-08-17

## 2023-08-17 RX ORDER — AMOXICILLIN 500 MG/1
500 CAPSULE ORAL 2 TIMES DAILY
Qty: 20 CAPSULE | Refills: 0 | Status: SHIPPED | OUTPATIENT
Start: 2023-08-17 | End: 2023-08-27

## 2023-08-17 ASSESSMENT — ENCOUNTER SYMPTOMS
WHEEZING: 0
RESPIRATORY NEGATIVE: 1
SINUS PRESSURE: 0
GASTROINTESTINAL NEGATIVE: 1
FEVER: 1
DIZZINESS: 1
SINUS PAIN: 0
CARDIOVASCULAR NEGATIVE: 1
RHINORRHEA: 0
COUGH: 0
SHORTNESS OF BREATH: 0
CHILLS: 1
FATIGUE: 0
PALPITATIONS: 0
SORE THROAT: 1

## 2023-08-17 NOTE — PROGRESS NOTES
Cristine Martin is a 22 year old, presenting for the following health issues:  Urgent Care (Sx since yesterday and got worse over night, had covid before and this sx doesn't feel like, might be strep ), Pharyngitis, Dizziness, Otalgia, and Chills    HPI   Acute Illness  Acute illness concerns:   Onset/Duration: 2days  Symptoms:  Fever: YES  Chills/Sweats: YES  Headache (location?): No  Sinus Pressure: No  Conjunctivitis:  No  Ear Pain: Yes  Rhinorrhea: No  Congestion: No  Sore Throat: YES- with white spots and swollen glands  Cough: no  Wheeze: No  Decreased Appetite: No  Nausea: No  Vomiting: No  Diarrhea: No  Dysuria/Freq.: No  Dysuria or Hematuria: No  Fatigue/Achiness: No  Sick/Strep Exposure: No  Therapies tried and outcome: rest, fluids,tylenol with minimal relief    Patient Active Problem List   Diagnosis    Strep throat     Current Outpatient Medications   Medication    levothyroxine (SYNTHROID/LEVOTHROID) 25 MCG tablet     No current facility-administered medications for this visit.        Allergies   Allergen Reactions    Miralax [Polyethylene Glycol] GI Disturbance       Review of Systems   Constitutional:  Positive for chills and fever. Negative for fatigue.   HENT:  Positive for ear pain and sore throat. Negative for congestion, ear discharge, hearing loss, rhinorrhea, sinus pressure and sinus pain.    Respiratory: Negative.  Negative for cough, shortness of breath and wheezing.    Cardiovascular: Negative.  Negative for chest pain, palpitations and peripheral edema.   Gastrointestinal: Negative.    Allergic/Immunologic: Negative for environmental allergies.   Neurological:  Positive for dizziness.   All other systems reviewed and are negative.           Objective    /77 (BP Location: Left arm, Patient Position: Sitting, Cuff Size: Adult Regular)   Pulse 99   Temp 100  F (37.8  C) (Tympanic)   Resp 20   Wt 62.2 kg (137 lb 1.6 oz)   LMP 08/01/2023 (Approximate)   SpO2 99%   BMI 24.29  kg/m    Body mass index is 24.29 kg/m .  Physical Exam  Vitals and nursing note reviewed.   Constitutional:       General: She is not in acute distress.     Appearance: Normal appearance. She is well-developed and normal weight. She is not ill-appearing.   HENT:      Head: Normocephalic and atraumatic.      Comments: TMs are intact without any erythema or bulging bilaterally.  Airway is patent.     Right Ear: Tympanic membrane is scarred.      Left Ear: Tympanic membrane is scarred.      Nose: Nose normal.      Mouth/Throat:      Lips: Pink.      Mouth: Mucous membranes are moist.      Pharynx: Uvula midline. Pharyngeal swelling, oropharyngeal exudate and posterior oropharyngeal erythema present. No uvula swelling.      Tonsils: Tonsillar exudate present. No tonsillar abscesses. 2+ on the right. 2+ on the left.   Eyes:      General: No scleral icterus.     Extraocular Movements: Extraocular movements intact.      Conjunctiva/sclera: Conjunctivae normal.      Pupils: Pupils are equal, round, and reactive to light.   Neck:      Thyroid: No thyromegaly.   Cardiovascular:      Rate and Rhythm: Normal rate and regular rhythm.      Pulses: Normal pulses.      Heart sounds: Normal heart sounds, S1 normal and S2 normal. No murmur heard.     No friction rub. No gallop.   Pulmonary:      Effort: Pulmonary effort is normal. No accessory muscle usage, respiratory distress or retractions.      Breath sounds: Normal breath sounds and air entry. No stridor. No decreased breath sounds, wheezing, rhonchi or rales.   Musculoskeletal:      Cervical back: Normal range of motion and neck supple.   Lymphadenopathy:      Head:      Right side of head: Tonsillar adenopathy present.      Left side of head: Tonsillar adenopathy present.      Cervical: No cervical adenopathy.   Skin:     General: Skin is warm and dry.      Nails: There is no clubbing.   Neurological:      Mental Status: She is alert and oriented to person, place, and time.    Psychiatric:         Mood and Affect: Mood normal.         Behavior: Behavior normal.         Thought Content: Thought content normal.         Judgment: Judgment normal.       Results for orders placed or performed in visit on 08/17/23 (from the past 24 hour(s))   Streptococcus A Rapid Screen w/Reflex to PCR - Clinic Collect    Specimen: Throat; Swab   Result Value Ref Range    Group A Strep antigen Negative Negative       Assessment/Plan:  Acute bacterial tonsillitis:  RST is negative, will send for strep PCR.  Will treat for tonsillitis with [id number] and give lidocaine oral viscous based on H&P.  Recommend tylenol/ibuprofen prn pain/fever, warm salt water gargles, lozenges or cough drops.  Recheck in clinic if symptoms worsen or if symptoms do not improve.    -     Streptococcus A Rapid Screen w/Reflex to PCR - Clinic Collect  -     Group A Streptococcus PCR Throat Swab  -     amoxicillin (AMOXIL) 500 MG capsule; Take 1 capsule (500 mg) by mouth 2 times daily for 10 days  -     lidocaine, viscous, (XYLOCAINE) 2 % solution; Swish and spit 15 mLs in mouth every 3 hours as needed for moderate pain ; Max 8 doses/24 hour period.        Simran Thompson PA-C

## 2023-10-04 ENCOUNTER — PATIENT OUTREACH (OUTPATIENT)
Dept: CARE COORDINATION | Facility: CLINIC | Age: 23
End: 2023-10-04
Payer: COMMERCIAL

## 2023-10-18 ENCOUNTER — PATIENT OUTREACH (OUTPATIENT)
Dept: CARE COORDINATION | Facility: CLINIC | Age: 23
End: 2023-10-18
Payer: COMMERCIAL

## 2023-11-01 DIAGNOSIS — E03.9 HYPOTHYROIDISM, UNSPECIFIED TYPE: ICD-10-CM

## 2023-11-01 RX ORDER — LEVOTHYROXINE SODIUM 25 UG/1
12.5 TABLET ORAL DAILY
Qty: 15 TABLET | Refills: 0 | Status: SHIPPED | OUTPATIENT
Start: 2023-11-01 | End: 2023-11-27

## 2023-11-27 DIAGNOSIS — E03.9 HYPOTHYROIDISM, UNSPECIFIED TYPE: ICD-10-CM

## 2023-11-27 RX ORDER — LEVOTHYROXINE SODIUM 25 UG/1
12.5 TABLET ORAL DAILY
Qty: 45 TABLET | Refills: 0 | Status: SHIPPED | OUTPATIENT
Start: 2023-11-27 | End: 2024-02-26

## 2023-12-03 ENCOUNTER — HEALTH MAINTENANCE LETTER (OUTPATIENT)
Age: 23
End: 2023-12-03

## 2024-02-24 DIAGNOSIS — E03.9 HYPOTHYROIDISM, UNSPECIFIED TYPE: ICD-10-CM

## 2024-02-26 RX ORDER — LEVOTHYROXINE SODIUM 25 UG/1
12.5 TABLET ORAL DAILY
Qty: 15 TABLET | Refills: 0 | Status: SHIPPED | OUTPATIENT
Start: 2024-02-26 | End: 2024-03-11

## 2024-03-10 DIAGNOSIS — E03.9 HYPOTHYROIDISM, UNSPECIFIED TYPE: ICD-10-CM

## 2024-03-11 RX ORDER — LEVOTHYROXINE SODIUM 25 UG/1
12.5 TABLET ORAL DAILY
Qty: 45 TABLET | Refills: 0 | Status: SHIPPED | OUTPATIENT
Start: 2024-03-11 | End: 2024-04-30

## 2024-03-11 NOTE — TELEPHONE ENCOUNTER
Please notify patient that rx refilled but needs appointment for next refill. Patient has not been seen since 2022.    Rosalio Wei MD

## 2024-03-15 ENCOUNTER — VIRTUAL VISIT (OUTPATIENT)
Dept: FAMILY MEDICINE | Facility: CLINIC | Age: 24
End: 2024-03-15
Payer: COMMERCIAL

## 2024-03-15 DIAGNOSIS — E03.9 HYPOTHYROIDISM, UNSPECIFIED TYPE: Primary | ICD-10-CM

## 2024-03-15 PROCEDURE — 99213 OFFICE O/P EST LOW 20 MIN: CPT | Mod: 93 | Performed by: FAMILY MEDICINE

## 2024-03-15 ASSESSMENT — ANXIETY QUESTIONNAIRES
5. BEING SO RESTLESS THAT IT IS HARD TO SIT STILL: SEVERAL DAYS
GAD7 TOTAL SCORE: 11
6. BECOMING EASILY ANNOYED OR IRRITABLE: MORE THAN HALF THE DAYS
1. FEELING NERVOUS, ANXIOUS, OR ON EDGE: MORE THAN HALF THE DAYS
3. WORRYING TOO MUCH ABOUT DIFFERENT THINGS: MORE THAN HALF THE DAYS
IF YOU CHECKED OFF ANY PROBLEMS ON THIS QUESTIONNAIRE, HOW DIFFICULT HAVE THESE PROBLEMS MADE IT FOR YOU TO DO YOUR WORK, TAKE CARE OF THINGS AT HOME, OR GET ALONG WITH OTHER PEOPLE: SOMEWHAT DIFFICULT
7. FEELING AFRAID AS IF SOMETHING AWFUL MIGHT HAPPEN: SEVERAL DAYS
GAD7 TOTAL SCORE: 11
2. NOT BEING ABLE TO STOP OR CONTROL WORRYING: MORE THAN HALF THE DAYS

## 2024-03-15 ASSESSMENT — PATIENT HEALTH QUESTIONNAIRE - PHQ9
10. IF YOU CHECKED OFF ANY PROBLEMS, HOW DIFFICULT HAVE THESE PROBLEMS MADE IT FOR YOU TO DO YOUR WORK, TAKE CARE OF THINGS AT HOME, OR GET ALONG WITH OTHER PEOPLE: SOMEWHAT DIFFICULT
SUM OF ALL RESPONSES TO PHQ QUESTIONS 1-9: 12
5. POOR APPETITE OR OVEREATING: SEVERAL DAYS
SUM OF ALL RESPONSES TO PHQ QUESTIONS 1-9: 12

## 2024-03-15 NOTE — PROGRESS NOTES
"Veronica is a 23 year old who is being evaluated via a billable telephone visit.    What phone number would you like to be contacted at? [phone]  How would you like to obtain your AVS? Mail a copy  Originating Location (pt. Location): Home    Distant Location (provider location):  On-site      Subjective   Veronica is a 23 year old, presenting for the following health issues:  Recheck Medication and Thyroid Problem        3/15/2024     8:32 AM   Additional Questions   Roomed by Maggi MILLER     History of Present Illness       Hypothyroidism:     Since last visit, patient describes the following symptoms::  Anxiety, Depression and Weight loss    Weight loss::  6-10 lbs.          Review of Systems  Constitutional, HEENT, cardiovascular, pulmonary, GI, , musculoskeletal, neuro, skin, endocrine and psych systems are negative, except as otherwise noted.      Objective    Vitals - Patient Reported  Weight (Patient Reported): 61.2 kg (135 lb)  Height (Patient Reported): 160 cm (5' 3\")  BMI (Based on Pt Reported Ht/Wt): 23.91        Physical Exam   General: Alert and no distress //Respiratory: No audible wheeze, cough, or shortness of breath // Psychiatric:  Appropriate affect, tone, and pace of words    A/P:  (E03.9) Hypothyroidism, unspecified type  (primary encounter diagnosis)  Comment:   Plan: TSH with free T4 reflex        Recheck and adjust dose if needed.        Phone call duration: 5 minutes  Signed Electronically by: Rosalio Wei MD, MD    "

## 2024-04-29 ENCOUNTER — LAB (OUTPATIENT)
Dept: LAB | Facility: CLINIC | Age: 24
End: 2024-04-29
Attending: FAMILY MEDICINE
Payer: COMMERCIAL

## 2024-04-29 DIAGNOSIS — E03.9 HYPOTHYROIDISM, UNSPECIFIED TYPE: ICD-10-CM

## 2024-04-29 PROCEDURE — 36415 COLL VENOUS BLD VENIPUNCTURE: CPT

## 2024-04-29 PROCEDURE — 84443 ASSAY THYROID STIM HORMONE: CPT

## 2024-04-30 LAB — TSH SERPL DL<=0.005 MIU/L-ACNC: 4.03 UIU/ML (ref 0.3–4.2)

## 2024-04-30 RX ORDER — LEVOTHYROXINE SODIUM 25 UG/1
12.5 TABLET ORAL DAILY
Qty: 45 TABLET | Refills: 3 | Status: SHIPPED | OUTPATIENT
Start: 2024-04-30

## 2024-06-18 ENCOUNTER — OFFICE VISIT (OUTPATIENT)
Dept: URGENT CARE | Facility: URGENT CARE | Age: 24
End: 2024-06-18
Payer: COMMERCIAL

## 2024-06-18 VITALS
DIASTOLIC BLOOD PRESSURE: 81 MMHG | TEMPERATURE: 100.7 F | BODY MASS INDEX: 23.91 KG/M2 | HEART RATE: 105 BPM | RESPIRATION RATE: 16 BRPM | OXYGEN SATURATION: 95 % | WEIGHT: 135 LBS | SYSTOLIC BLOOD PRESSURE: 115 MMHG

## 2024-06-18 DIAGNOSIS — R50.9 FEVER, UNSPECIFIED FEVER CAUSE: ICD-10-CM

## 2024-06-18 DIAGNOSIS — R59.0 SUBMANDIBULAR LYMPHADENOPATHY: ICD-10-CM

## 2024-06-18 DIAGNOSIS — J03.90 ACUTE TONSILLITIS, UNSPECIFIED ETIOLOGY: Primary | ICD-10-CM

## 2024-06-18 DIAGNOSIS — R07.0 THROAT PAIN: ICD-10-CM

## 2024-06-18 LAB
DEPRECATED S PYO AG THROAT QL EIA: NEGATIVE
FLUAV AG SPEC QL IA: POSITIVE
FLUBV AG SPEC QL IA: POSITIVE
GROUP A STREP BY PCR: NOT DETECTED

## 2024-06-18 PROCEDURE — 99213 OFFICE O/P EST LOW 20 MIN: CPT | Performed by: PHYSICIAN ASSISTANT

## 2024-06-18 PROCEDURE — 87651 STREP A DNA AMP PROBE: CPT | Performed by: PHYSICIAN ASSISTANT

## 2024-06-18 PROCEDURE — 87804 INFLUENZA ASSAY W/OPTIC: CPT | Performed by: PHYSICIAN ASSISTANT

## 2024-06-18 RX ORDER — AMOXICILLIN 500 MG/1
500 CAPSULE ORAL 2 TIMES DAILY
Qty: 20 CAPSULE | Refills: 0 | Status: SHIPPED | OUTPATIENT
Start: 2024-06-18 | End: 2024-06-28

## 2024-06-18 ASSESSMENT — PAIN SCALES - GENERAL: PAINLEVEL: SEVERE PAIN (7)

## 2024-06-18 NOTE — PROGRESS NOTES
"  Chief Complaint   Patient presents with    Pharyngitis     Patient reports sore throat beginning yesterday and noticed \"white spots\" in throat    Otalgia     Right ear pain     Results for orders placed or performed in visit on 06/18/24   Streptococcus A Rapid Screen w/Reflex to PCR - Clinic Collect     Status: Normal    Specimen: Throat; Swab   Result Value Ref Range    Group A Strep antigen Negative Negative           ASSESSMENT:     ICD-10-CM    1. Acute tonsillitis, unspecified etiology  J03.90 amoxicillin (AMOXIL) 500 MG capsule     Influenza A & B Antigen - Clinic Collect      2. Submandibular lymphadenopathy  R59.0 amoxicillin (AMOXIL) 500 MG capsule     Influenza A & B Antigen - Clinic Collect      3. Fever, unspecified fever cause  R50.9 amoxicillin (AMOXIL) 500 MG capsule     Influenza A & B Antigen - Clinic Collect      4. Throat pain  R07.0 Streptococcus A Rapid Screen w/Reflex to PCR - Clinic Collect     amoxicillin (AMOXIL) 500 MG capsule     Group A Streptococcus PCR Throat Swab     Influenza A & B Antigen - Clinic Collect            PLAN: Classic signs for strep.  Further strep test pending.  Will treat with amoxicillin.  Also rapid influenza done just to make sure she does not have influenza.  Will MyChart with results.  I have discussed clinical findings with patient.  Side effects of medications discussed.  Symptomatic care is discussed.  I have discussed the possibility of  worsening symptoms and indication to RTC or go to the ER if they occur.  All questions are answered, patient indicates understanding of these issues and is in agreement with plan.   Patient care instructions are discussed/given at the end of visit.   Lots of rest and fluids.      Anel Reyes PA-C      SUBJECTIVE:  23-year-old female presents for right greater than left-sided sore throat and right ear pain that started yesterday.  White spots on the right side.  No vomiting or diarrhea.  No rash.  Some body aches.  " Fever today.      Allergies   Allergen Reactions    Miralax [Polyethylene Glycol] GI Disturbance       No past medical history on file.    Current Outpatient Medications   Medication Sig Dispense Refill    levothyroxine (SYNTHROID/LEVOTHROID) 25 MCG tablet Take 0.5 tablets (12.5 mcg) by mouth daily 45 tablet 3    lidocaine, viscous, (XYLOCAINE) 2 % solution Swish and spit 15 mLs in mouth every 3 hours as needed for moderate pain ; Max 8 doses/24 hour period. (Patient not taking: Reported on 3/15/2024) 100 mL 0     No current facility-administered medications for this visit.       Social History     Tobacco Use    Smoking status: Never     Passive exposure: Never    Smokeless tobacco: Never   Substance Use Topics    Alcohol use: Yes     Comment: occ       ROS:  CONSTITUTIONAL: Negative for fatigue or fever.  EYES: Negative for eye problems.  ENT: As above.  RESP: As above.  CV: Negative for chest pains.  GI: Negative for vomiting.  MUSCULOSKELETAL:  Negative for significant muscle or joint pains.  NEUROLOGIC: Negative for headaches.  SKIN: Negative for rash.  PSYCH: Normal mentation for age.    OBJECTIVE:  /81 (BP Location: Left arm, Patient Position: Sitting, Cuff Size: Adult Regular)   Pulse 105   Temp (!) 100.7  F (38.2  C) (Tympanic)   Resp 16   Wt 61.2 kg (135 lb)   SpO2 95%   BMI 23.91 kg/m    GENERAL APPEARANCE: Healthy, alert and no distress.  EYES:Conjunctiva/sclera clear.  EARS: No cerumen.   Ear canals w/o erythema.  TM's intact w/o erythema.    THROAT: Moderate erythema with 1+ tonsillar enlargement, right tonsil slightly larger than left with exudate.  Uvula midline.  .  NECK: Supple, mild right tender submandibular lymphadenopathy.    RESP: Lungs clear to auscultation - no rales, rhonchi or wheezes  CV: Regular rate and rhythm, normal S1 S2, no murmur noted.  NEURO: Awake, alert    SKIN: No rashes      Anel Reyes PA-C

## 2025-01-05 ENCOUNTER — HEALTH MAINTENANCE LETTER (OUTPATIENT)
Age: 25
End: 2025-01-05

## 2025-05-31 DIAGNOSIS — E03.9 HYPOTHYROIDISM, UNSPECIFIED TYPE: ICD-10-CM

## 2025-06-02 RX ORDER — LEVOTHYROXINE SODIUM 25 UG/1
12.5 TABLET ORAL DAILY
Qty: 15 TABLET | Refills: 0 | Status: SHIPPED | OUTPATIENT
Start: 2025-06-02

## 2025-07-30 DIAGNOSIS — E03.9 HYPOTHYROIDISM, UNSPECIFIED TYPE: ICD-10-CM

## 2025-07-30 RX ORDER — LEVOTHYROXINE SODIUM 25 MCG
TABLET ORAL
Qty: 45 TABLET | Refills: 0 | Status: SHIPPED | OUTPATIENT
Start: 2025-07-30